# Patient Record
Sex: FEMALE | Race: WHITE | NOT HISPANIC OR LATINO | Employment: UNEMPLOYED | URBAN - METROPOLITAN AREA
[De-identification: names, ages, dates, MRNs, and addresses within clinical notes are randomized per-mention and may not be internally consistent; named-entity substitution may affect disease eponyms.]

---
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
## 2024-01-12 DIAGNOSIS — N92.6 IRREGULAR PERIODS: ICD-10-CM

## 2024-01-12 DIAGNOSIS — Z30.09 GENERAL COUNSELING FOR PRESCRIPTION OF ORAL CONTRACEPTIVES: ICD-10-CM

## 2024-01-12 RX ORDER — NORETHINDRONE ACETATE AND ETHINYL ESTRADIOL AND FERROUS FUMARATE 1MG-20(21)
1 KIT ORAL DAILY
Qty: 84 TABLET | Refills: 0 | Status: SHIPPED | OUTPATIENT
Start: 2024-01-12 | End: 2024-03-11

## 2024-03-11 ENCOUNTER — OFFICE VISIT (OUTPATIENT)
Dept: FAMILY MEDICINE | Facility: CLINIC | Age: 19
End: 2024-03-11
Payer: COMMERCIAL

## 2024-03-11 VITALS
HEART RATE: 81 BPM | OXYGEN SATURATION: 100 % | BODY MASS INDEX: 21.66 KG/M2 | SYSTOLIC BLOOD PRESSURE: 102 MMHG | WEIGHT: 130 LBS | DIASTOLIC BLOOD PRESSURE: 66 MMHG | TEMPERATURE: 98 F | HEIGHT: 65 IN | RESPIRATION RATE: 16 BRPM

## 2024-03-11 DIAGNOSIS — N92.6 IRREGULAR PERIODS: ICD-10-CM

## 2024-03-11 DIAGNOSIS — Z30.09 GENERAL COUNSELING FOR PRESCRIPTION OF ORAL CONTRACEPTIVES: Primary | ICD-10-CM

## 2024-03-11 DIAGNOSIS — L74.510 AXILLARY HYPERHIDROSIS: ICD-10-CM

## 2024-03-11 PROCEDURE — 99214 OFFICE O/P EST MOD 30 MIN: CPT | Performed by: PHYSICIAN ASSISTANT

## 2024-03-11 RX ORDER — NORETHINDRONE ACETATE AND ETHINYL ESTRADIOL 1MG-20(21)
1 KIT ORAL DAILY
Qty: 84 TABLET | Refills: 3 | Status: SHIPPED | OUTPATIENT
Start: 2024-03-11

## 2024-03-11 ASSESSMENT — PAIN SCALES - GENERAL: PAINLEVEL: NO PAIN (0)

## 2024-03-11 NOTE — PATIENT INSTRUCTIONS
You can try using Drysol antiperspirant:    Prescription strength antiperspirants should be applied nightly to the armpits until improvement is noted; significant improvement may be noted within one week. After improvement, then decrease to 1-2 times per week for maintenance.     Unfortunately, treatment with strong antiperspirants is often limited by skin irritation, especially in the armpit region. You could try hydrocortisone cream to help alleviate the irritation.    To reduce the risk of irritation, these products should be applied to dry skin between episodes of sweating. Ideally, apply at bedtime when sweating is at a minimum, allowed to remain in place for six to eight hours, and washed off in the morning. Others have recommended using a hair dryer to quickly dry the skin before application and immediately after application, or to use baking soda powder in the morning to neutralize any remaining aluminum chloride.

## 2024-03-11 NOTE — PROGRESS NOTES
"  Assessment & Plan     General counseling for prescription of oral contraceptives  Irregular periods  Chronic, doing well. No contraindications. Not sexually active.   - norethindrone-ethinyl estradiol (JUNEL FE 1/20) 1-20 MG-MCG tablet; Take 1 tablet by mouth daily    Axillary hyperhidrosis  Discussed using Drysol and how to help alleviate skin irritation. Could follow-up with dermatology if no improvement or if irritation limits use.      Florentin Dahl is a 18 year old, presenting for the following health issues:  Recheck Medication (Birth control)        3/11/2024    10:35 AM   Additional Questions   Roomed by Nancy COTTON     History of Present Illness       Reason for visit:  Birth control refill    She eats 2-3 servings of fruits and vegetables daily.She consumes 0 sweetened beverage(s) daily.She exercises with enough effort to increase her heart rate 30 to 60 minutes per day.  She exercises with enough effort to increase her heart rate 4 days per week.   She is taking medications regularly.     Needs refill of OCP's. Uses for her periods and this is working well. Regular monthly periods, no breakthrough bleeding or side effects. No sexually active (never has been).   Non-smoker, no migraine with aura, no family or personal history of blood clotting disorder. No HTN.     Reports axillary hyperhidrosis for many years. No other sweating concerns. She has tried stronger antiperspirants which seem to help but are irritating to her skin. Wondering if there are other options.      Review of Systems  Constitutional, HEENT, cardiovascular, pulmonary, gi and gu systems are negative, except as otherwise noted.      Objective    /66 (BP Location: Left arm, Patient Position: Sitting, Cuff Size: Adult Regular)   Pulse 81   Temp 98  F (36.7  C) (Oral)   Resp 16   Ht 1.638 m (5' 4.5\")   Wt 59 kg (130 lb)   LMP 02/26/2024 (Approximate)   SpO2 100%   BMI 21.97 kg/m    Body mass index is 21.97 " kg/m .  Physical Exam   GENERAL: alert and no distress  RESP: lungs clear to auscultation - no rales, rhonchi or wheezes  CV: regular rate and rhythm, normal S1 S2, no S3 or S4, no murmur, click or rub  PSYCH: mentation appears normal, affect normal/bright        Signed Electronically by: Love London PA-C

## 2024-06-28 ENCOUNTER — TELEPHONE (OUTPATIENT)
Dept: FAMILY MEDICINE | Facility: CLINIC | Age: 19
End: 2024-06-28

## 2024-06-28 NOTE — CONFIDENTIAL NOTE
Patient Quality Outreach    Patient is due for the following:   Physical Preventive Adult Physical    Next Steps:   Schedule a Adult Preventative    Type of outreach:    Sent [employer] message.      Questions for provider review:    None           Boy Adrian MA

## 2024-06-28 NOTE — LETTER
[facility]  [address] [address]  [phone]       July 12, 2024    Nabila Quinonez  [address] [address]    Dear Nabila,    We care about your health and have reviewed your health plan and are making recommendations based on this review, to optimize your health.    You are in particular need of attention regarding:  -Wellness (Physical) Visit     We are recommending that you:  -schedule a WELLNESS (Physical) APPOINTMENT with me.   I will check fasting labs the same day - nothing to eat except water and meds for 8-10 hours prior.    In addition, here is a list of due or overdue Health Maintenance reminders.    Health Maintenance Due   Topic Date Due    Discuss Advance Care Planning  Never done    HIV Screening  Never done    ANNUAL REVIEW OF HM ORDERS  07/12/2022    Hepatitis C Screening  Never done    COVID-19 Vaccine (3 - 2023-24 season) 09/01/2023    Yearly Preventive Visit  11/21/2023       To address the above recommendations, we encourage you to contact us at [phone], via [employer] or by contacting Central Scheduling toll free at [phone] 24 hours a day. They will assist you with finding the most convenient time and location.    Thank you for trusting [facility] and we appreciate the opportunity to serve you.  We look forward to supporting your healthcare needs in the future.    Healthy Regards,    Your [facility] Team

## 2024-06-28 NOTE — LETTER
[facility]  [address] [address]  [phone]       October 10, 2024    Nabila Quinonez  [address] [address]    Dear Nabila,    We care about your health and have reviewed your health plan and are making recommendations based on this review, to optimize your health.    You are in particular need of attention regarding:  -Wellness (Physical) Visit     We are recommending that you:  -schedule a WELLNESS (Physical) APPOINTMENT with me.   I will check fasting labs the same day - nothing to eat except water and meds for 8-10 hours prior.    In addition, here is a list of due or overdue Health Maintenance reminders.    Health Maintenance Due   Topic Date Due    Discuss Advance Care Planning  Never done    HIV Screening  Never done    ANNUAL REVIEW OF HM ORDERS  07/12/2022    Hepatitis C Screening  Never done    Yearly Preventive Visit  11/21/2023    Flu Vaccine (1) 09/01/2024    COVID-19 Vaccine (3 - 2024-25 season) 09/01/2024       To address the above recommendations, we encourage you to contact us at [phone], via [employer] or by contacting Central Scheduling toll free at [phone] 24 hours a day. They will assist you with finding the most convenient time and location.    Thank you for trusting [facility] and we appreciate the opportunity to serve you.  We look forward to supporting your healthcare needs in the future.    Healthy Regards,    Your [facility] Team

## 2024-07-12 NOTE — CONFIDENTIAL NOTE
Patient Quality Outreach    Patient is due for the following:   Physical Preventive Adult Physical    Next Steps:   Schedule a Adult Preventative    Type of outreach:    Sent letter.    Next Steps:  Reach out within 90 days via Letter.    Max number of attempts reached: No. Will try again in 90 days if patient still on fail list.    Questions for provider review:    None           Boy Adrian MA

## 2024-10-10 NOTE — CONFIDENTIAL NOTE
Patient Quality Outreach    Patient is due for the following:   Physical Preventive Adult Physical    Next Steps:   Schedule a Adult Preventative    Type of outreach:    Sent letter.      Questions for provider review:    None           Boy Adrian MA

## 2025-05-21 DIAGNOSIS — Z30.09 GENERAL COUNSELING FOR PRESCRIPTION OF ORAL CONTRACEPTIVES: ICD-10-CM

## 2025-05-21 DIAGNOSIS — N92.6 IRREGULAR PERIODS: ICD-10-CM

## 2025-05-21 RX ORDER — NORETHINDRONE ACETATE AND ETHINYL ESTRADIOL AND FERROUS FUMARATE 1MG-20(21)
1 KIT ORAL DAILY
Qty: 84 TABLET | Refills: 0 | Status: SHIPPED | OUTPATIENT
Start: 2025-05-21 | End: 2025-05-22

## 2025-05-21 NOTE — TELEPHONE ENCOUNTER
Received a call from pt,     Pt is calling to discuss refill of birth control. She states she is leaving for the summer until August and is requesting it refilled until then.     Writer advised that an appointment is needed.   Assisted pt in scheduling a visit with Danette Shook PA-C on 5/22 at 9:10 AM in [place].     Pt verbalizes understanding and is agreeable with plan. Pt denies any further questions or concerns at this time.     Afia TY RN   Clinic RN  [facility]

## 2025-05-21 NOTE — TELEPHONE ENCOUNTER
Medication is being filled for 1 time refill only due to:  Patient needs to be seen because it has been more than one year since last visit.  Selam Gunter RN, BSN  [facility]

## 2025-05-22 ENCOUNTER — OFFICE VISIT (OUTPATIENT)
Dept: PEDIATRICS | Facility: CLINIC | Age: 20
End: 2025-05-22
Payer: COMMERCIAL

## 2025-05-22 VITALS
OXYGEN SATURATION: 100 % | RESPIRATION RATE: 14 BRPM | TEMPERATURE: 98 F | BODY MASS INDEX: 21.08 KG/M2 | SYSTOLIC BLOOD PRESSURE: 123 MMHG | DIASTOLIC BLOOD PRESSURE: 81 MMHG | WEIGHT: 126.5 LBS | HEIGHT: 65 IN | HEART RATE: 83 BPM

## 2025-05-22 DIAGNOSIS — N92.6 IRREGULAR PERIODS: ICD-10-CM

## 2025-05-22 DIAGNOSIS — Z30.09 GENERAL COUNSELING FOR PRESCRIPTION OF ORAL CONTRACEPTIVES: ICD-10-CM

## 2025-05-22 RX ORDER — NORETHINDRONE ACETATE AND ETHINYL ESTRADIOL 1MG-20(21)
1 KIT ORAL DAILY
Qty: 84 TABLET | Refills: 4 | Status: SHIPPED | OUTPATIENT
Start: 2025-05-22

## 2025-05-22 ASSESSMENT — PAIN SCALES - GENERAL: PAINLEVEL_OUTOF10: NO PAIN (0)

## 2025-05-22 NOTE — PROGRESS NOTES
"  Assessment & Plan     General counseling for prescription of oral contraceptives  - norethindrone-ethinyl estradiol (AUROVELA FE 1/20) 1-20 MG-MCG tablet; Take 1 tablet by mouth daily.    Irregular periods  - norethindrone-ethinyl estradiol (AUROVELA FE 1/20) 1-20 MG-MCG tablet; Take 1 tablet by mouth daily.    Return in one year.     Florentin Dahl is a 19 year old, presenting for the following health issues:  Recheck Medication        5/22/2025     9:11 AM   Additional Questions   Roomed by Connie Milner   Accompanied by N/a     History of Present Illness       Reason for visit:  Birth control refill She is missing 7 dose(s) of medications per week.      Patient is here for an rx refill. She would like her OCP refilled. Tolerating well. No BTB. No concerns. Not due for PAP.    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     Review of Systems  Constitutional, HEENT, cardiovascular, pulmonary, gi and gu systems are negative, except as otherwise noted.      Objective    /81 (BP Location: Right arm, Patient Position: Sitting, Cuff Size: Adult Regular)   Pulse 83   Temp 98  F (36.7  C) (Temporal)   Resp 14   Ht 1.638 m (5' 4.5\")   Wt 57.4 kg (126 lb 8 oz)   LMP 05/08/2025 (Approximate)   SpO2 100%   BMI 21.38 kg/m    Body mass index is 21.38 kg/m .  Physical Exam   GENERAL: alert and no distress    No results found for any visits on 05/22/25.        Signed Electronically by: Nydia Shook PA-C    "